# Patient Record
Sex: FEMALE | Race: WHITE | HISPANIC OR LATINO
[De-identification: names, ages, dates, MRNs, and addresses within clinical notes are randomized per-mention and may not be internally consistent; named-entity substitution may affect disease eponyms.]

---

## 2022-08-15 PROBLEM — Z00.00 ENCOUNTER FOR PREVENTIVE HEALTH EXAMINATION: Status: ACTIVE | Noted: 2022-08-15

## 2022-08-19 ENCOUNTER — APPOINTMENT (OUTPATIENT)
Dept: CARDIOLOGY | Facility: CLINIC | Age: 62
End: 2022-08-19

## 2022-08-19 ENCOUNTER — RESULT CHARGE (OUTPATIENT)
Age: 62
End: 2022-08-19

## 2022-08-19 VITALS
BODY MASS INDEX: 25.99 KG/M2 | HEART RATE: 70 BPM | SYSTOLIC BLOOD PRESSURE: 128 MMHG | TEMPERATURE: 98 F | WEIGHT: 156 LBS | RESPIRATION RATE: 14 BRPM | DIASTOLIC BLOOD PRESSURE: 88 MMHG | HEIGHT: 65 IN

## 2022-08-19 DIAGNOSIS — Z78.9 OTHER SPECIFIED HEALTH STATUS: ICD-10-CM

## 2022-08-19 DIAGNOSIS — R00.2 PALPITATIONS: ICD-10-CM

## 2022-08-19 PROCEDURE — 93000 ELECTROCARDIOGRAM COMPLETE: CPT

## 2022-08-19 PROCEDURE — 93242 EXT ECG>48HR<7D RECORDING: CPT

## 2022-08-19 PROCEDURE — 99204 OFFICE O/P NEW MOD 45 MIN: CPT

## 2022-08-19 NOTE — HISTORY OF PRESENT ILLNESS
[FreeTextEntry1] : Ms. Galvez is a 62-year-old woman with no known significant past medical history presenting for evaluation of palpitations.\par \par She is very physically active and exercises several times a week (weights, cardio). However, over the past few weeks to months she's noticed nearly daily episodes of palpitations occurring both at rest and with exertion and causing her to stop her activities. She denies chest pain, dyspnea, pre-syncope, syncope, LE swelling, PND, or orthopnea.\par \par She denies any family history of premature ASCVD or SCD.\par \par She is being worked up for sleep apnea as her  has noticed she occasionally "stops breathing" at night.\par \par ECG:\par - NSR, normal axis, normal intervals, no ischemic changes\par \par Labs:\par - 8/5/22: Cr 0.7, , HDL 54, TG 65, LDL 95, Non-

## 2022-08-19 NOTE — PHYSICAL EXAM
[Well Developed] : well developed [Well Nourished] : well nourished [No Acute Distress] : no acute distress [Normal Conjunctiva] : normal conjunctiva [Normal Venous Pressure] : normal venous pressure [No Carotid Bruit] : no carotid bruit [Normal S1, S2] : normal S1, S2 [No Murmur] : no murmur [No Rub] : no rub [No Gallop] : no gallop [Clear Lung Fields] : clear lung fields [Good Air Entry] : good air entry [No Respiratory Distress] : no respiratory distress  [Soft] : abdomen soft [Non Tender] : non-tender [No Masses/organomegaly] : no masses/organomegaly [Normal Bowel Sounds] : normal bowel sounds [Normal Gait] : normal gait [No Edema] : no edema [No Cyanosis] : no cyanosis [No Clubbing] : no clubbing [No Varicosities] : no varicosities [No Rash] : no rash [No Skin Lesions] : no skin lesions [Moves all extremities] : moves all extremities [No Focal Deficits] : no focal deficits [Normal Speech] : normal speech [Alert and Oriented] : alert and oriented [Normal memory] : normal memory [de-identified] : Appears younger than stated age.

## 2022-08-19 NOTE — ASSESSMENT
[FreeTextEntry1] : Ms. Galvez is a 62-year-old woman with no known significant past medical history presenting for evaluation of palpitations.\par \par Impression:\par (1) Palpitations: nearly daily, occurring with both exertion and at rest, affecting patient's quality of life. She recalls being told by another cardiologist that she had an abnormality on her echo.\par (2) Possible ELIGIO, planned for sleep study\par \par Plan:\par - TTE to evaluate for structural abnormalities\par - 7 day ambulatory rhythm monitor to evaluate for arrhythmia\par - Encouraged sleep study as scheduled\par - Primordial and primary ASCVD risk prevention was discussed. Patient was informed of the "Essential 8" guidelines for health including: (1) physical activity (>150min moderate or >75min vigorous exercise per week); (2) diet [I recommend a pesco-Mediterranean diet with ~16 hours of daily fasting]; (3) adequate/good quality sleep; (4) weight loss (BMI <25); (5) avoidance/cessation of smoking; (6) cholesterol (LDL as low as possible; TChol <200); (7) BP control (<120/80); (8) glucose control (fasting BG <100). Source: https://www.ahajournals.org/doi/epdf/[email or web address]/[email or web address]\par \par Follow up after the above testing.

## 2022-09-06 ENCOUNTER — APPOINTMENT (OUTPATIENT)
Dept: CARDIOLOGY | Facility: CLINIC | Age: 62
End: 2022-09-06

## 2022-09-06 PROCEDURE — 93306 TTE W/DOPPLER COMPLETE: CPT

## 2022-09-15 ENCOUNTER — APPOINTMENT (OUTPATIENT)
Dept: CARDIOLOGY | Facility: CLINIC | Age: 62
End: 2022-09-15